# Patient Record
Sex: FEMALE | Race: OTHER | HISPANIC OR LATINO | URBAN - METROPOLITAN AREA
[De-identification: names, ages, dates, MRNs, and addresses within clinical notes are randomized per-mention and may not be internally consistent; named-entity substitution may affect disease eponyms.]

---

## 2017-03-22 ENCOUNTER — EMERGENCY (EMERGENCY)
Facility: HOSPITAL | Age: 47
LOS: 1 days | Discharge: HOME | End: 2017-03-22
Attending: EMERGENCY MEDICINE | Admitting: EMERGENCY MEDICINE
Payer: COMMERCIAL

## 2017-03-22 VITALS
RESPIRATION RATE: 16 BRPM | OXYGEN SATURATION: 97 % | TEMPERATURE: 98 F | DIASTOLIC BLOOD PRESSURE: 98 MMHG | SYSTOLIC BLOOD PRESSURE: 140 MMHG

## 2017-03-22 VITALS
DIASTOLIC BLOOD PRESSURE: 99 MMHG | TEMPERATURE: 98 F | SYSTOLIC BLOOD PRESSURE: 150 MMHG | OXYGEN SATURATION: 97 % | RESPIRATION RATE: 18 BRPM | HEART RATE: 113 BPM

## 2017-03-22 DIAGNOSIS — F41.9 ANXIETY DISORDER, UNSPECIFIED: ICD-10-CM

## 2017-03-22 DIAGNOSIS — F41.0 PANIC DISORDER [EPISODIC PAROXYSMAL ANXIETY]: ICD-10-CM

## 2017-03-22 PROCEDURE — 99284 EMERGENCY DEPT VISIT MOD MDM: CPT

## 2017-03-22 PROCEDURE — 93005 ELECTROCARDIOGRAM TRACING: CPT

## 2017-03-22 PROCEDURE — 99283 EMERGENCY DEPT VISIT LOW MDM: CPT | Mod: 25

## 2017-03-22 RX ORDER — ALPRAZOLAM 0.25 MG
0.25 TABLET ORAL ONCE
Qty: 0 | Refills: 0 | Status: DISCONTINUED | OUTPATIENT
Start: 2017-03-22 | End: 2017-03-22

## 2017-03-22 RX ORDER — ALPRAZOLAM 0.25 MG
1 TABLET ORAL
Qty: 6 | Refills: 0 | OUTPATIENT
Start: 2017-03-22 | End: 2017-03-24

## 2017-03-22 RX ADMIN — Medication 0.25 MILLIGRAM(S): at 18:12

## 2017-03-22 NOTE — ED PROVIDER NOTE - MEDICAL DECISION MAKING DETAILS
pt w/anxiety attack after getting into argument w/coworker and then being agitated at [facility], currently symptom free - eating and drinking, given xanax, feeling much improved, will d/c w/few pills of xanax, to f/u w/pmd, understands to rest, requesting work excuse - given, no suspicion for cva or acs, pt understands and agrees w/plan

## 2017-03-22 NOTE — ED ADULT NURSE NOTE - OBJECTIVE STATEMENT
Pt comes from [facility] with HTN and anxiety after stress at work. Friend at bedside. Denies HA, visual changes, CP, SOB, no other complaints.

## 2017-03-22 NOTE — ED PROVIDER NOTE - ATTENDING CONTRIBUTION TO CARE
46 F- co feeling a lot of stress- had an altercation w co-worker- bp elev at nurses office- then urgent care- got upset at [facility]- developed jitterness/ha- bp elevated- to ED  vss  s1s2  lungs cta bl  abd soft nt nd +bs  ext no c/c/e  IMP- Anxiety reaction  supportive care/xanax  reassess

## 2017-03-22 NOTE — ED ADULT NURSE NOTE - CHIEF COMPLAINT QUOTE
patient was feeling anxiety went into [facility] MD  and started to feel a panic attack. found to be hypertensive was given 25mg metoprolol. states that she is feeling better no dizziness, chest pan,.

## 2017-03-22 NOTE — ED PROVIDER NOTE - OBJECTIVE STATEMENT
The pt is a 47 y/o F, who presents to ED c/o panic attack PTA. Pt states that she had been under lots of stress at work, has been working long days, has not had a vacation in > 1 yr, had lots of meetings today and got into an argument w/a coworker that she does not like -- as a result felt like her head was "foggy", went to the office rn and was advised to take the day off and go rest, told that her bp was 145/90. Pt however decided to stop by an [facility] - wanting bp checked, but states "they treated me poorly and I became agitated and very upset", her bp went up to 190s at which point [facility] called EMS and sent her away. Pt states that she felt like her heart was racing, hands were tingling and felt clammy, head felt foggy again - all symptoms resolved once she went outside, currently symptom free. Denies any cp at any point, sob, dizziness, diaphoresis, h/a, visual changes, hearing loss, gait disturbances, n/v/d, abd pain

## 2017-03-22 NOTE — ED ADULT TRIAGE NOTE - CHIEF COMPLAINT QUOTE
patient was feeling anxiety went into [facility]  and started to feel a panic attack. found to be hypertensive was given 25mg metoprolol. states that she is feeling better no dizziness, chest pan,.

## 2017-03-22 NOTE — ED PROVIDER NOTE - PSYCHIATRIC, MLM
Alert and oriented to person, place, time/situation. anxious mood and affect, but directable and rational. no apparent risk to self or others.

## 2017-03-23 ENCOUNTER — EMERGENCY (EMERGENCY)
Facility: HOSPITAL | Age: 47
LOS: 1 days | Discharge: HOME | End: 2017-03-23
Attending: EMERGENCY MEDICINE | Admitting: EMERGENCY MEDICINE
Payer: COMMERCIAL

## 2017-03-23 VITALS
RESPIRATION RATE: 18 BRPM | WEIGHT: 121.25 LBS | OXYGEN SATURATION: 100 % | SYSTOLIC BLOOD PRESSURE: 139 MMHG | HEIGHT: 65.75 IN | DIASTOLIC BLOOD PRESSURE: 103 MMHG | TEMPERATURE: 98 F | HEART RATE: 88 BPM

## 2017-03-23 VITALS — SYSTOLIC BLOOD PRESSURE: 122 MMHG | HEART RATE: 81 BPM | DIASTOLIC BLOOD PRESSURE: 88 MMHG

## 2017-03-23 DIAGNOSIS — F41.9 ANXIETY DISORDER, UNSPECIFIED: ICD-10-CM

## 2017-03-23 DIAGNOSIS — R42 DIZZINESS AND GIDDINESS: ICD-10-CM

## 2017-03-23 DIAGNOSIS — Z79.899 OTHER LONG TERM (CURRENT) DRUG THERAPY: ICD-10-CM

## 2017-03-23 PROCEDURE — 99282 EMERGENCY DEPT VISIT SF MDM: CPT

## 2017-03-23 PROCEDURE — 99283 EMERGENCY DEPT VISIT LOW MDM: CPT | Mod: 25

## 2017-03-23 NOTE — ED PROVIDER NOTE - OBJECTIVE STATEMENT
returns to ED as again feeling anxious after visit today and noted elevated BP -> reports improved at time of this interview ( seen zaida for stress related anxiety event today )

## 2017-03-23 NOTE — ED ADULT NURSE NOTE - OBJECTIVE STATEMENT
47 y/o female who was d/c from ER this evening returned c/o redness to right knee and "weird feeling in head". Patient was d/c with prescription for 1 xanax and took one in ER at 6 pm this evening. Denies vision changes, ataxia, LOC.

## 2017-03-23 NOTE — ED ADULT TRIAGE NOTE - ARRIVAL INFO ADDITIONAL COMMENTS
pt states "I was having a panic attack at work today and they checked my blood pressure and it was high". reports 140-150/90. seen here earlier and took xanax, went home and felt lightheaded, c/o redness to knee, appear anxious. pt states "I was having a panic attack at work today and they checked my blood pressure and it was high". reports 140-150/90. seen here earlier and took xanax, went home and felt lightheaded, c/o redness to knee, appear anxious. states "I didn't take the other xanax because I felt better"